# Patient Record
Sex: MALE | Race: WHITE | URBAN - METROPOLITAN AREA
[De-identification: names, ages, dates, MRNs, and addresses within clinical notes are randomized per-mention and may not be internally consistent; named-entity substitution may affect disease eponyms.]

---

## 2021-01-19 ENCOUNTER — OUT OF OFFICE VISIT (OUTPATIENT)
Dept: URBAN - METROPOLITAN AREA MEDICAL CENTER 34 | Facility: MEDICAL CENTER | Age: 51
End: 2021-01-19
Payer: SELF-PAY

## 2021-01-19 DIAGNOSIS — R79.89 ABNORMAL C-REACTIVE PROTEIN: ICD-10-CM

## 2021-01-19 DIAGNOSIS — R17 CHOLESTATIC JAUNDICE: ICD-10-CM

## 2021-01-19 DIAGNOSIS — R74.01 ELEVATED ALANINE AMINOTRANSFERASE (ALT) LEVEL: ICD-10-CM

## 2021-01-19 DIAGNOSIS — R10.10 ABDOMINAL WALL PAIN IN RIGHT UPPER QUADRANT: ICD-10-CM

## 2021-01-19 PROCEDURE — 99254 IP/OBS CNSLTJ NEW/EST MOD 60: CPT | Performed by: INTERNAL MEDICINE

## 2021-01-20 ENCOUNTER — OUT OF OFFICE VISIT (OUTPATIENT)
Dept: URBAN - METROPOLITAN AREA MEDICAL CENTER 34 | Facility: MEDICAL CENTER | Age: 51
End: 2021-01-20
Payer: SELF-PAY

## 2021-01-20 DIAGNOSIS — R59.0 ABDOMINAL LYMPHADENOPATHY: ICD-10-CM

## 2021-01-20 DIAGNOSIS — B16.9 ACUTE HEPATITIS B: ICD-10-CM

## 2021-01-20 PROCEDURE — 99232 SBSQ HOSP IP/OBS MODERATE 35: CPT | Performed by: INTERNAL MEDICINE

## 2021-02-01 ENCOUNTER — OUT OF OFFICE VISIT (OUTPATIENT)
Dept: URBAN - METROPOLITAN AREA MEDICAL CENTER 34 | Facility: MEDICAL CENTER | Age: 51
End: 2021-02-01
Payer: SELF-PAY

## 2021-02-01 DIAGNOSIS — B16.9 ACUTE HEPATITIS B: ICD-10-CM

## 2021-02-01 DIAGNOSIS — D68.9 COAGULOPATHY: ICD-10-CM

## 2021-02-01 DIAGNOSIS — R79.89 ABNORMAL C-REACTIVE PROTEIN: ICD-10-CM

## 2021-02-01 DIAGNOSIS — R74.8 ABNORMAL ALKALINE PHOSPHATASE TEST: ICD-10-CM

## 2021-02-01 PROCEDURE — 99232 SBSQ HOSP IP/OBS MODERATE 35: CPT | Performed by: INTERNAL MEDICINE

## 2021-02-01 PROCEDURE — 99233 SBSQ HOSP IP/OBS HIGH 50: CPT | Performed by: INTERNAL MEDICINE

## 2021-02-08 ENCOUNTER — OFFICE VISIT (OUTPATIENT)
Dept: URBAN - METROPOLITAN AREA CLINIC 94 | Facility: CLINIC | Age: 51
End: 2021-02-08
Payer: SELF-PAY

## 2021-02-08 ENCOUNTER — WEB ENCOUNTER (OUTPATIENT)
Dept: URBAN - METROPOLITAN AREA CLINIC 94 | Facility: CLINIC | Age: 51
End: 2021-02-08

## 2021-02-08 DIAGNOSIS — R79.89 ELEVATED LFTS: ICD-10-CM

## 2021-02-08 DIAGNOSIS — B16.9 ACUTE HEPATITIS B: ICD-10-CM

## 2021-02-08 PROCEDURE — 99214 OFFICE O/P EST MOD 30 MIN: CPT | Performed by: INTERNAL MEDICINE

## 2021-02-08 PROCEDURE — G8482 FLU IMMUNIZE ORDER/ADMIN: HCPCS | Performed by: INTERNAL MEDICINE

## 2021-02-08 PROCEDURE — 1036F TOBACCO NON-USER: CPT | Performed by: INTERNAL MEDICINE

## 2021-02-08 PROCEDURE — G8427 DOCREV CUR MEDS BY ELIG CLIN: HCPCS | Performed by: INTERNAL MEDICINE

## 2021-02-08 RX ORDER — FUROSEMIDE 40 MG/1
1 TABLET TABLET ORAL ONCE A DAY
Status: ACTIVE | COMMUNITY

## 2021-02-08 RX ORDER — SPIRONOLACTONE 50 MG/1
1 TABLET TABLET, FILM COATED ORAL ONCE A DAY
Qty: 60 | Refills: 1 | OUTPATIENT
Start: 2021-02-08 | End: 2021-04-09

## 2021-02-08 RX ORDER — TENOFOVIR DISPROXIL FUMARATE 300 MG/1
1 TABLET TABLET ORAL ONCE A DAY
Status: ACTIVE | COMMUNITY

## 2021-02-08 NOTE — PHYSICAL EXAM GASTROINTESTINAL
Abdomen , soft, nontender, distended abdomen, no guarding or rigidity , no masses palpable , normal bowel sounds , Liver and Spleen , no hepatomegaly present , no hepatosplenomegaly , liver nontender , spleen not palpable

## 2021-02-08 NOTE — HPI-TODAY'S VISIT:
51 y/o M here for f/u after recent hospitalization for elevated LFT's 2/2 acute Hep B   Patient admitted last month for a couple of weeks for upper abdominal pain/jaundice and found to have markedly elevated transaminitis in 1000's, with elevated INR. His labwork consistent with acute Hep B(positive core IgM) with viral load 5.1million, for which started on tenofovir with subsuqent improvment in viral load to 2080(01/31/21). Hep eAg positive. Other workup with negative HIV, Hep C, AL/AMA/ceruloplasmin and borderline ASMA(21). His iron sat was high but HFE result negative(only one C282Y mutation). His CT a/p 01/22 and 01/29 with splenomegaly, and mesenteric congestion(no mention if cirrhotic liver), and ultrasound abd with minimal ascites(not enough to tap)  Today, reports having LE swelling despite lasix 40mg. Is also taking potassium supplements. Also taking OTC Vit K supplements  Discharge LFT's , ALT 1329, T.bili 21.6,  , INR 1.7 normal Hgb, Cr

## 2021-02-09 ENCOUNTER — TELEPHONE ENCOUNTER (OUTPATIENT)
Dept: URBAN - METROPOLITAN AREA CLINIC 94 | Facility: CLINIC | Age: 51
End: 2021-02-09

## 2021-02-10 LAB
A/G RATIO: 1.3
ALBUMIN: 2.9
ALKALINE PHOSPHATASE: 108
ALT (SGPT): 587
AST (SGOT): 434
BILIRUBIN, TOTAL: 21.9
BUN/CREATININE RATIO: 15
BUN: 11
CALCIUM: 8.1
CARBON DIOXIDE, TOTAL: 26
CHLORIDE: 104
CREATININE: 0.73
EGFR IF AFRICN AM: 127
EGFR IF NONAFRICN AM: 110
GLOBULIN, TOTAL: 2.2
GLUCOSE: 107
HEMATOCRIT: 36.2
HEMATOLOGY COMMENTS:: (no result)
HEMOGLOBIN: 12.9
INR: 1.5
MCH: 31.7
MCHC: 35.6
MCV: 89
NRBC: (no result)
PLATELETS: 107
POTASSIUM: 3.2
PROTEIN, TOTAL: 5.1
PROTHROMBIN TIME: 15.8
RBC: 4.07
RDW: 18.2
SODIUM: 141
WBC: 4.2

## 2021-02-15 ENCOUNTER — TELEPHONE ENCOUNTER (OUTPATIENT)
Dept: URBAN - METROPOLITAN AREA CLINIC 92 | Facility: CLINIC | Age: 51
End: 2021-02-15

## 2021-02-22 ENCOUNTER — LAB OUTSIDE AN ENCOUNTER (OUTPATIENT)
Dept: URBAN - METROPOLITAN AREA CLINIC 94 | Facility: CLINIC | Age: 51
End: 2021-02-22

## 2021-03-02 ENCOUNTER — OFFICE VISIT (OUTPATIENT)
Dept: URBAN - METROPOLITAN AREA CLINIC 94 | Facility: CLINIC | Age: 51
End: 2021-03-02
Payer: SELF-PAY

## 2021-03-02 ENCOUNTER — DASHBOARD ENCOUNTERS (OUTPATIENT)
Age: 51
End: 2021-03-02

## 2021-03-02 VITALS
HEIGHT: 70 IN | TEMPERATURE: 97 F | SYSTOLIC BLOOD PRESSURE: 104 MMHG | WEIGHT: 213 LBS | DIASTOLIC BLOOD PRESSURE: 69 MMHG | HEART RATE: 71 BPM | BODY MASS INDEX: 30.49 KG/M2

## 2021-03-02 DIAGNOSIS — B16.9 ACUTE HEPATITIS B: ICD-10-CM

## 2021-03-02 DIAGNOSIS — R18.8 ASCITES OF LIVER: ICD-10-CM

## 2021-03-02 DIAGNOSIS — R74.8 ELEVATED LIVER ENZYMES: ICD-10-CM

## 2021-03-02 PROBLEM — 236004002: Status: ACTIVE | Noted: 2021-03-02

## 2021-03-02 PROBLEM — 76795007 ACUTE HEPATITIS B: Status: ACTIVE | Noted: 2021-02-08

## 2021-03-02 PROCEDURE — 99213 OFFICE O/P EST LOW 20 MIN: CPT | Performed by: PHYSICIAN ASSISTANT

## 2021-03-02 RX ORDER — FUROSEMIDE 40 MG/1
1 TABLET TABLET ORAL ONCE A DAY
Status: ACTIVE | COMMUNITY

## 2021-03-02 RX ORDER — SPIRONOLACTONE 50 MG/1
1 TABLET TABLET, FILM COATED ORAL ONCE A DAY
Qty: 60 | Refills: 1 | Status: ACTIVE | COMMUNITY
Start: 2021-02-08 | End: 2021-04-09

## 2021-03-02 RX ORDER — TENOFOVIR DISPROXIL FUMARATE 300 MG/1
1 TABLET TABLET ORAL ONCE A DAY
Status: ACTIVE | COMMUNITY

## 2021-03-02 RX ORDER — TENOFOVIR DISPROXIL FUMARATE 300 MG/1
1 TABLET TABLET ORAL ONCE A DAY
Qty: 30 | Refills: 6

## 2021-03-02 RX ORDER — ONDANSETRON HYDROCHLORIDE 4 MG/1
1 TABLET TABLET, FILM COATED ORAL BID
Qty: 60 | Refills: 3 | OUTPATIENT
Start: 2021-03-03

## 2021-03-02 NOTE — HPI-TODAY'S VISIT:
49 y/o M here for f/u visit for acute Hep B.   Patient previously admitted last month to [facility] for a couple of weeks for upper abdominal pain/jaundice and found to have markedly elevated transaminitis in 1000's, with elevated INR. His labwork consistent with acute Hep B(positive core IgM) with viral load 5.1million, for which started on tenofovir with subsuqent improvment in viral load to 2080(01/31/21). Hep eAg positive. Other workup with negative HIV, Hep C, AL/AMA/ceruloplasmin and borderline ASMA(21). His iron sat was high but HFE result negative(only one C282Y mutation). His CT a/p 01/22 and 01/29 with splenomegaly, and mesenteric congestion(no mention if cirrhotic liver), and ultrasound abd with minimal ascites(not enough to tap)Discharge LFT's , ALT 1329, T.bili 21.6,  , INR 1.7 normal Hgb, Cr Pt then f/u in clinic , , T. Bili 21, Plt 107, INR 1.5   Today he states that he was seen at [facility] ER on 2/19 for flank pain.  CT revealed Small LEFT pleural effusion. Bibasilar subsegmental atelectasis. Cirrhotic liver morphology with evidence of portal hypertension including splenomegaly, stable. Small to moderate volume ascites. 6 mm nonobstructing RIGHT renal calculus.  Multiple borderline sized mesenteric and retroperitoneal lymph nodes, stable. BILATERAL inguinal hernias containing ascitic fluid. T Bili 11, plt 113, , . Pt states that he has episodes of dizziness but otherwise stable.   Pt states that he has appt with DR Sloan on Monday. 3/8.

## 2021-03-02 NOTE — PHYSICAL EXAM GASTROINTESTINAL
Abdomen , diffusely tender, distended , no guarding or rigidity , no masses palpable , normal bowel sounds ,

## 2021-03-03 LAB
A/G RATIO: 1.2
ALBUMIN: 3.6
ALKALINE PHOSPHATASE: 156
ALT (SGPT): 171
AST (SGOT): 197
BASO (ABSOLUTE): 0
BASOS: 0
BILIRUBIN, TOTAL: 7.5
BUN/CREATININE RATIO: 33
BUN: 29
CALCIUM: 8.8
CARBON DIOXIDE, TOTAL: 22
CHLORIDE: 100
CREATININE: 0.88
EGFR IF AFRICN AM: 117
EGFR IF NONAFRICN AM: 102
EOS (ABSOLUTE): 0.1
EOS: 2
GLOBULIN, TOTAL: 2.9
GLUCOSE: 89
HEMATOCRIT: 39.9
HEMATOLOGY COMMENTS:: (no result)
HEMOGLOBIN: 14.2
IMMATURE CELLS: (no result)
IMMATURE GRANS (ABS): 0
IMMATURE GRANULOCYTES: 0
INR: 1.2
LYMPHS (ABSOLUTE): 1.1
LYMPHS: 20
MCH: 32.4
MCHC: 35.6
MCV: 91
MONOCYTES(ABSOLUTE): 0.5
MONOCYTES: 9
NEUTROPHILS (ABSOLUTE): 3.8
NEUTROPHILS: 69
NRBC: (no result)
PLATELETS: 80
POTASSIUM: 4.3
PROTEIN, TOTAL: 6.5
PROTHROMBIN TIME: 13
RBC: 4.38
RDW: 16.8
SODIUM: 134
WBC: 5.5

## 2021-03-09 ENCOUNTER — OFFICE VISIT (OUTPATIENT)
Dept: URBAN - METROPOLITAN AREA CLINIC 94 | Facility: CLINIC | Age: 51
End: 2021-03-09